# Patient Record
Sex: FEMALE | URBAN - METROPOLITAN AREA
[De-identification: names, ages, dates, MRNs, and addresses within clinical notes are randomized per-mention and may not be internally consistent; named-entity substitution may affect disease eponyms.]

---

## 2023-02-13 ENCOUNTER — HOSPITAL ENCOUNTER (EMERGENCY)
Age: 73
Discharge: HOME | End: 2023-02-13

## 2023-02-13 VITALS
HEART RATE: 76 BPM | DIASTOLIC BLOOD PRESSURE: 79 MMHG | SYSTOLIC BLOOD PRESSURE: 148 MMHG | TEMPERATURE: 98.3 F | RESPIRATION RATE: 16 BRPM | OXYGEN SATURATION: 97 % | WEIGHT: 198 LBS | HEIGHT: 69 IN | BODY MASS INDEX: 29.33 KG/M2

## 2023-02-13 DIAGNOSIS — R68.89 FLU-LIKE SYMPTOMS: Primary | ICD-10-CM

## 2023-02-13 LAB — SARS-COV-2, NAAT: NOT DETECTED

## 2023-02-13 PROCEDURE — 99283 EMERGENCY DEPT VISIT LOW MDM: CPT

## 2023-02-13 PROCEDURE — 87635 SARS-COV-2 COVID-19 AMP PRB: CPT

## 2023-02-13 ASSESSMENT — PAIN DESCRIPTION - DESCRIPTORS: DESCRIPTORS: ACHING

## 2023-02-13 ASSESSMENT — PAIN SCALES - GENERAL
PAINLEVEL_OUTOF10: 2
PAINLEVEL_OUTOF10: 3

## 2023-02-13 ASSESSMENT — PAIN - FUNCTIONAL ASSESSMENT
PAIN_FUNCTIONAL_ASSESSMENT: 0-10
PAIN_FUNCTIONAL_ASSESSMENT: NONE - DENIES PAIN
PAIN_FUNCTIONAL_ASSESSMENT: ACTIVITIES ARE NOT PREVENTED

## 2023-02-13 ASSESSMENT — PAIN DESCRIPTION - FREQUENCY: FREQUENCY: CONTINUOUS

## 2023-02-13 ASSESSMENT — PAIN DESCRIPTION - ORIENTATION: ORIENTATION: INNER

## 2023-02-13 ASSESSMENT — PAIN DESCRIPTION - ONSET: ONSET: ON-GOING

## 2023-02-13 ASSESSMENT — PAIN DESCRIPTION - LOCATION
LOCATION: THROAT
LOCATION: THROAT

## 2023-02-13 ASSESSMENT — PAIN DESCRIPTION - PAIN TYPE: TYPE: ACUTE PAIN

## 2023-02-13 NOTE — ED PROVIDER NOTES
[facility]        Pt Name: Getachew Luna  MRN: [id number]  [place] 1950  Date of evaluation: 2/13/2023  Provider: Richard Ravi PA-C  PCP: No primary care provider on file. Note Started: 5:26 PM EST 2/13/23      CONNIE. I have evaluated this patient. My supervising physician was available for consultation. CHIEF COMPLAINT       Chief Complaint   Patient presents with    Other     Covid symptoms wants tested.  cov +    Pharyngitis     Sore throat on going        HISTORY OF PRESENT ILLNESS: 1 or more Elements     History From: Patient  Limitations to history : None    Getachew Luna is a 74 y.o. female who presents to the emergency department by private vehicle with her  with concern for COVID-19. Patient has not tested for COVID-19, requesting testing. She has had a sore throat, fever, chills, slight headache for the past 4 days. Her symptoms have been improving.  was just diagnosed with COVID and she is requesting testing and possible monoclonal antibody infusion. Nursing Notes were all reviewed and agreed with or any disagreements were addressed in the HPI. REVIEW OF SYSTEMS :      Review of Systems    Positives and Pertinent negatives as per HPI. SURGICAL HISTORY   No past surgical history on file. CURRENTMEDICATIONS       There are no discharge medications for this patient. ALLERGIES     Patient has no known allergies. FAMILYHISTORY     No family history on file.      SOCIAL HISTORY          SCREENINGS        Chloe Coma Scale  Eye Opening: Spontaneous  Best Verbal Response: Oriented  Best Motor Response: Obeys commands  Lindsay Coma Scale Score: 15                CIWA Assessment  BP: (!) 148/79  Heart Rate: 76           PHYSICAL EXAM  1 or more Elements     ED Triage Vitals   BP Temp Temp Source Heart Rate Resp SpO2 Height Weight   02/13/23 1712 02/13/23 1710 02/13/23 1710 02/13/23 1710 02/13/23 1710 02/13/23 1710 02/13/23 1710 02/13/23 1710   (!) 162/74 97 °F (36.1 °C) Tympanic 73 17 97 % 5' 9\" (1.753 m) 198 lb (89.8 kg)       Physical Exam  Constitutional:       Appearance: She is well-developed.   HENT:      Head: Normocephalic and atraumatic.   Cardiovascular:      Rate and Rhythm: Normal rate and regular rhythm.   Pulmonary:      Effort: Pulmonary effort is normal. No respiratory distress.      Breath sounds: Normal breath sounds. No stridor. No wheezing, rhonchi or rales.   Musculoskeletal:         General: Normal range of motion.      Cervical back: Normal range of motion and neck supple.   Skin:     General: Skin is warm.   Neurological:      General: No focal deficit present.      Mental Status: She is alert and oriented to person, place, and time.   Psychiatric:         Mood and Affect: Mood normal.           DIAGNOSTIC RESULTS   LABS:    Labs Reviewed   COVID-19, RAPID       When ordered only abnormal lab results are displayed. All other labs were within normal range or not returned as of this dictation.    EKG: When ordered, EKG's are interpreted by the Emergency Department Physician in the absence of a cardiologist.  Please see their note for interpretation of EKG.    RADIOLOGY:   Non-plain film images such as CT, Ultrasound and MRI are read by the radiologist. Plain radiographic images are visualized and preliminarily interpreted by the ED Provider with the below findings:        Interpretation per the Radiologist below, if available at the time of this note:    No orders to display     No results found.    No results found.    PROCEDURES   Unless otherwise noted below, none     Procedures    CRITICAL CARE TIME (.cctime)   0    PAST MEDICAL HISTORY      has no past medical history on file.     EMERGENCY DEPARTMENT COURSE and DIFFERENTIAL DIAGNOSIS/MDM:   Vitals:    Vitals:    02/13/23 1710 02/13/23 1712 02/13/23 1835   BP:  (!) 162/74 (!) 148/79   Pulse: 73  76   Resp:  17  16   Temp: 97 °F (36.1 °C)  98.3 °F (36.8 °C)   TempSrc: Tympanic  Oral   SpO2: 97%  97%   Weight: 198 lb (89.8 kg)     Height: 5' 9\" (1.753 m)         Patient was given the following medications:  Medications - No data to display          Is this patient to be included in the SEP-1 Core Measure due to severe sepsis or septic shock? No   Exclusion criteria - the patient is NOT to be included for SEP-1 Core Measure due to:  2+ SIRS criteria are not met    Chronic Conditions affecting care:    has no past medical history on file. CONSULTS: (Who and What was discussed)  None      Social Determinants : None    Records Reviewed (Source): no    CC/HPI Summary, DDx, ED Course, and Reassessment:     Patient has been tested positive for COVID-19, she has symptoms consistent with COVID and requesting testing, possible monoclonal antibody infusion. Blood pressure mildly elevated in the ED. Remainder vitals normal.  She is nontoxic-appearing. COVID-19 obtained and negative. Confirm with our pharmacist that monoclonal antibody infusion we had previously is no longer recommended by CDC. Patient informed of this. Paxlovid offered. Patient declined Paxlovid. Overall her symptoms have been improving. Care is supportive. Return precautions discussed. She is comfortable plan. She is discharged home in stable condition. The patient tolerated their visit well. I saw the patient independently with physician available for consultation as needed. I have discussed the findings of today's workup with the patient and addressed the patient's questions and concerns. Important warning signs as well as new or worsening symptoms which would necessitate immediate return to the ED were discussed. The plan is to discharge from the ED at this time, and the patient is in stable condition. The patient acknowledged understanding is agreeable with this plan.     Disposition Considerations (tests considered but not done, Admit vs D/C, Shared Decision Making, Pt Expectation of Test or Tx.): see above       I am the Primary Clinician of Record. FINAL IMPRESSION      1. Flu-like symptoms          DISPOSITION/PLAN     DISPOSITION Decision To Discharge 02/13/2023 06:40:28 PM      PATIENT REFERRED TO:  [facility] Emergency Department  [address] [facility]  [phone]  Go to   If symptoms worsen    see medical clinic list below    Call   For follow up and reevaluation. DISCHARGE MEDICATIONS:  There are no discharge medications for this patient. DISCONTINUED MEDICATIONS:  There are no discharge medications for this patient.              (Please note that portions of this note were completed with a voice recognition program.  Efforts were made to edit the dictations but occasionally words are mis-transcribed.)    Josefina Loya, MICHAEL (electronically signed)           Josefina Loya, MICHAEL  02/13/23 4545

## 2023-02-13 NOTE — DISCHARGE INSTRUCTIONS
[facility] Referral number [id number] for Primary Care      [facility] CLINICS/COMMUNITY HEALTH CENTERS  [facility]  [address] [address]  [phone]  Fax [phone]  Medical, OB/Gyn, Pediatrics, [facility]  Serves all of [facility] Prenatal Center)  [address]  [place], [place] [address]  [phone]       [facility]  [address] [facility] (Administrative offices)  [phone]  Homeless only [facility] ([place])  [address] [place] [address]  [phone] or [phone], Albanian [phone],   Dental Appointments [phone] or [phone]  Pediatric, Family Practice, X-Ray  Serves all of [facility] ([facility])  [address]  [place], [place] [id number] [phone]   [facility] ([facility])   [address] [facility]    (Located in [facility])  [phone] or [phone], Albanian [phone],   Dental Appointments [phone] or [phone]     [facility]  [address] [place], [place] [address]  [phone] [facility]  [address] [facility]  [phone]   [facility]  [address] [facility] [address]  [id number] [phone] Fax [phone]  General Practice    Serves [place] and Surrounding areas [facility]  [address] [facility] [address]  [phone] Fax [phone]  Medical, OB/Gyn, Pediatrics  Dental Clinic, [facility] only     [facility]  [address] [facility] [address]  [phone] Fax [phone]  Family Practice, Pediatrics, OB/Gyn, [facility]  Dental Clinic [phone]  Serves [facility]  [address]    [phone]  Urgent Care, Open 24 hrs, Urgent care, Gyn, Prenatal, Dental Mental, Translators     [facility] [address] [place] [phone]  (Located: [facility] Head [facility] Resource  [address] [facility])  Pediatrics [phone], [address] [facility],   Dental Appointments [phone] or [phone] [address] [facility]  [address], Bi, Pediatrics, [address] [facility]  [facility]  [address], [place], [address]  [phone] Fax [phone]  Pediatrics, [facility]  [address] [facility]. [address] [address]  [phone]   Fax [phone]  Pediatrics, [address]  [address] [place]. [address]  [address] [facility] [address] [address] [phone]  Pediatrics, Internal Med, [facility], Dental Clinic [facility] 99  [address] [place] [address]   [phone] [address]  [phone] Fax [phone]  [facility]  Sliding scale fee  All [place] area     [address] [facility]  [address] [facility]. [place], [place]  [place] [facility] 63  [address] [facility] 189, [address]  [address] [facility],[place] [address]   [address] [place] [address], [address].   [place], [address]  [facility]  [phone]  Fax:  [phone]  [facility] Internal Medicine and Pediatrics  [phone]  Fax:  [phone]  Fer Alex Amin  Resident Practice  [phone]  Fax:  [address] [facility]  [phone]  Fax:  [phone]     400 [facility] ([address] of [facility])  4060 [facility], [address]  [phone]    Tip Sales (Continued)    [facility], MARCELA Source of [facility])  [address]  RANDEE Mott 88  94 [facility] (formerly Critical access hospital)   [address] [facility] route [address], [id number]   [facility], MARCELA Source of [facility])  [address] [facility] 3950 [facility], [facility]  [phone]       [address]. [facility]  ([address] [facility] [address])  [address] [place], [address]  [address] [facility]  ([facility] [place])  [address] Niels [address], [facility], [address]  [address] [facility]  ([address] of [facility])  Saqib [address], [address]  [phone]   [address] ([address] of [facility])  [facility] [address] [facility]  [phone]    [address] [facility] 29, [facility]  [phone]  General Family Practice, Pediatrics  Services all areas sliding [facility] 178, [address] [facility] [address]  [address], Pediatrics, Gavin Belcher [address]   (formerly [facility]  2300 [facility], [address] [facility]  [address] [place] Tressa [address] ([address])  [address] [place], [address]  [address]  [facility] [place] ([address] of [facility])  [address]    Mevelyn Bowels  9911 [address]53, Prenatal, Dental, Mental, [address] [facility] [address]   [phone]

## 2023-02-13 NOTE — ED NOTES
Pt resting in bed at this time, laying in a supine position with head of bed elevated . Call light remains in reach instructed pt how to use, and encouraged pt to call if needed assistance, no distress noted. RR even and unlabored, skin warm and dry. No needs at this time. Will continue to monitor closely.        Ben Hirsch RN  02/13/23 2576

## 2023-02-14 NOTE — ED NOTES
Report given to receiving RN Lucas Contreras, all questions answered.       Mariia Dallas RN  02/13/23 1930

## 2024-09-08 ENCOUNTER — HOSPITAL ENCOUNTER (EMERGENCY)
Age: 74
Discharge: LEFT BEFORE DISCHARGE | End: 2024-09-08